# Patient Record
Sex: MALE | Race: WHITE | Employment: FULL TIME
[De-identification: names, ages, dates, MRNs, and addresses within clinical notes are randomized per-mention and may not be internally consistent; named-entity substitution may affect disease eponyms.]

---

## 2023-05-25 ENCOUNTER — NURSE TRIAGE (OUTPATIENT)
Dept: OTHER | Facility: CLINIC | Age: 33
End: 2023-05-25

## 2023-05-25 NOTE — TELEPHONE ENCOUNTER
Location of patient: [address] herrmann from [place] at [facility]; Patient with [employer] Complaint requesting to establish care with [facility]. Subjective: Caller states \"poison ivy on face, hands, between toes, groin area\"     Current Symptoms: Poison Ivy rash on face, arms, back, hands, between toes, bottom of feet and groin area    Has spread since yesterday. Derral Shannon to [facility] and was given Atarax and a steroid. More pain with walking on      Onset: 2 days ago; gradual, worsening    Associated Symptoms: reduced activity, reduced appetite, increased wakefulness    Pain Severity: 6/10; sharp, burning; constant    Temperature: none     What has been tried: 4 epsom salt baths,Calamine    LMP: NA Pregnant: NA    Recommended disposition: Go to Office Now    [facility] if no appts- Has called 7 offices for appts     Care advice provided, patient verbalizes understanding; denies any other questions or concerns; instructed to call back for any new or worsening symptoms. Patient/Caller agrees with recommended disposition; writer provided warm transfer to CYPH[facility] at [facility] for appointment scheduling    Attention Provider: Thank you for allowing me to participate in the care of your patient. The patient was connected to triage in response to information provided to the [facility]. Please do not respond through this encounter as the response is not directed to a shared pool.       Reason for Disposition   Large blisters or oozing sores    Protocols used: [address] [place] [address] - [facility]